# Patient Record
Sex: FEMALE | Race: OTHER | URBAN - METROPOLITAN AREA
[De-identification: names, ages, dates, MRNs, and addresses within clinical notes are randomized per-mention and may not be internally consistent; named-entity substitution may affect disease eponyms.]

---

## 2022-02-18 ENCOUNTER — OFFICE VISIT (OUTPATIENT)
Dept: OBGYN CLINIC | Facility: CLINIC | Age: 37
End: 2022-02-18
Payer: COMMERCIAL

## 2022-02-18 VITALS — WEIGHT: 147 LBS | DIASTOLIC BLOOD PRESSURE: 75 MMHG | SYSTOLIC BLOOD PRESSURE: 115 MMHG

## 2022-02-18 DIAGNOSIS — N92.6 IRREGULAR MENSTRUAL CYCLE: Primary | ICD-10-CM

## 2022-02-18 PROCEDURE — 3074F SYST BP LT 130 MM HG: CPT | Performed by: OBSTETRICS & GYNECOLOGY

## 2022-02-18 PROCEDURE — 3078F DIAST BP <80 MM HG: CPT | Performed by: OBSTETRICS & GYNECOLOGY

## 2022-02-18 PROCEDURE — 99202 OFFICE O/P NEW SF 15 MIN: CPT | Performed by: OBSTETRICS & GYNECOLOGY

## 2022-02-18 NOTE — PROGRESS NOTES
Subjective:   Patient ID: Colleen Toledo is a 31year old female. HPI  New patient  GYN problem visit  Complains of irregular menstrual cycle this month with occurrence starting February 12 with spotting and then moderate flow. Has had a previous menses on January 30. Usually cycles are every 28 days lasting 4 days and normal.  Has a history of a tubal ligation. Denies recent stress, medications. Has had general health screening labs within the past few months which she states was normal as well as a Pap test though she does not exactly know the results. States that she has had postcoital bleeding the majority of the times over the past year. Counseled on usual etiology. As she has bleeding currently, defers pelvic exam to next week. We will try to obtain her results of her labs and Pap test.  History/Other:   Review of Systems  No current outpatient medications on file. Allergies:No Known Allergies    Objective:   Physical Exam    Assessment & Plan:   No diagnosis found. No orders of the defined types were placed in this encounter.       Meds This Visit:  Requested Prescriptions      No prescriptions requested or ordered in this encounter       Imaging & Referrals:  None

## 2022-02-28 ENCOUNTER — OFFICE VISIT (OUTPATIENT)
Dept: OBGYN CLINIC | Facility: CLINIC | Age: 37
End: 2022-02-28
Payer: COMMERCIAL

## 2022-02-28 VITALS — SYSTOLIC BLOOD PRESSURE: 104 MMHG | WEIGHT: 151 LBS | HEART RATE: 76 BPM | DIASTOLIC BLOOD PRESSURE: 61 MMHG

## 2022-02-28 DIAGNOSIS — N93.0 POSTCOITAL BLEEDING: ICD-10-CM

## 2022-02-28 DIAGNOSIS — N92.6 IRREGULAR MENSTRUAL CYCLE: Primary | ICD-10-CM

## 2022-02-28 PROCEDURE — 99213 OFFICE O/P EST LOW 20 MIN: CPT | Performed by: OBSTETRICS & GYNECOLOGY

## 2022-02-28 PROCEDURE — 3074F SYST BP LT 130 MM HG: CPT | Performed by: OBSTETRICS & GYNECOLOGY

## 2022-02-28 PROCEDURE — 3078F DIAST BP <80 MM HG: CPT | Performed by: OBSTETRICS & GYNECOLOGY

## 2022-02-28 NOTE — PROGRESS NOTES
HPI:    Patient ID: Hilary Patel is a 31year old year old female. HPI  Gyne f/u  Blood work reviewed shows hemoglobin of 10.4 low with low MCV 7.8 normal platelets. CMP was normal Pap test and HPV were normal and negative vitamin D low at 16 TSH normal at 0.708. Patient described postcoital bleeding the majority of encounters for the past year. In brief. Review of Systems   neg  No current outpatient medications on file. Physical Exam     Vitals: /61   Pulse 76   Wt 151 lb (68.5 kg)   LMP 02/12/2022 (Exact Date)   Genitourinary:   Pelvic exam was performed with patient supine and chaperone present. External genitalia- normal.  Bartholin's and [place]'s glands normal.  Urethral meatus- without lesions, mass, or discharge. Urethra- normal without lesion, cyst, mass, or tenderness. Vulva- normal.  Labia majora and minora without lesions. Vagina- normal, no lesions or discharge. Moist and well supported. Bladder-  nontender. No masses. Normal support. No evidence of cystocele,  abnormal bladder neck mobility or evident urinary incontinence. Cervix- smooth, normal epithelium without lesions or discharge. No motion tenderness. Uterus- normal size, shape, and contour. Nontender. No masses. Adnexa-  Nontender, no masses. Perineum- normal without lesions  Anus-  Normal appearing without lesions. ASSESSMENT/PLAN:  Irregular menstrual cycle. To monitor to determine if sporadic or the start of some change. Normal smooth appearing cervix and normal pelvic exam  Reassured. No orders of the defined types were placed in this encounter. No outpatient encounter medications on file as of 2/28/2022. No facility-administered encounter medications on file as of 2/28/2022.

## 2022-03-15 ENCOUNTER — TELEPHONE (OUTPATIENT)
Dept: OBGYN CLINIC | Facility: CLINIC | Age: 37
End: 2022-03-15

## 2022-03-15 RX ORDER — FERROUS SULFATE 325(65) MG
325 TABLET ORAL
Qty: 30 TABLET | Refills: 2 | Status: SHIPPED | OUTPATIENT
Start: 2022-03-15 | End: 2023-03-15

## 2022-03-15 NOTE — TELEPHONE ENCOUNTER
Patient name and  verified. Patient informed [address] sent by Lee Camara to [facility] pharmacy on file. No further questions.

## 2022-06-13 RX ORDER — FERROUS SULFATE 325(65) MG
TABLET ORAL
Qty: 90 TABLET | Refills: 0 | Status: SHIPPED | OUTPATIENT
Start: 2022-06-13

## 2022-09-26 ENCOUNTER — OFFICE VISIT (OUTPATIENT)
Dept: OBGYN CLINIC | Facility: CLINIC | Age: 37
End: 2022-09-26

## 2022-09-26 VITALS — WEIGHT: 154 LBS | DIASTOLIC BLOOD PRESSURE: 75 MMHG | SYSTOLIC BLOOD PRESSURE: 111 MMHG

## 2022-09-26 DIAGNOSIS — R23.4 BREAST SKIN CHANGES: Primary | ICD-10-CM

## 2022-09-26 DIAGNOSIS — D64.9 ANEMIA, UNSPECIFIED TYPE: ICD-10-CM

## 2022-09-26 PROBLEM — Z01.419 WELL WOMAN EXAM WITH ROUTINE GYNECOLOGICAL EXAM: Status: ACTIVE | Noted: 2022-09-26

## 2022-09-26 PROCEDURE — 99213 OFFICE O/P EST LOW 20 MIN: CPT | Performed by: OBSTETRICS & GYNECOLOGY

## 2022-09-26 PROCEDURE — 3078F DIAST BP <80 MM HG: CPT | Performed by: OBSTETRICS & GYNECOLOGY

## 2022-09-26 PROCEDURE — 3074F SYST BP LT 130 MM HG: CPT | Performed by: OBSTETRICS & GYNECOLOGY

## 2022-12-18 ENCOUNTER — WALK IN (OUTPATIENT)
Dept: URGENT CARE | Age: 37
End: 2022-12-18
Attending: FAMILY MEDICINE

## 2022-12-18 VITALS
HEART RATE: 88 BPM | TEMPERATURE: 98.1 F | DIASTOLIC BLOOD PRESSURE: 88 MMHG | SYSTOLIC BLOOD PRESSURE: 100 MMHG | OXYGEN SATURATION: 100 % | RESPIRATION RATE: 20 BRPM

## 2022-12-18 DIAGNOSIS — J02.9 SORE THROAT: Primary | ICD-10-CM

## 2022-12-18 LAB
INTERNAL PROCEDURAL CONTROLS ACCEPTABLE: YES
S PYO AG THROAT QL IA.RAPID: NEGATIVE

## 2022-12-18 PROCEDURE — 10002803 HB RX 637: Performed by: PHYSICIAN ASSISTANT

## 2022-12-18 PROCEDURE — 99203 OFFICE O/P NEW LOW 30 MIN: CPT

## 2022-12-18 PROCEDURE — 87880 STREP A ASSAY W/OPTIC: CPT | Performed by: PHYSICIAN ASSISTANT

## 2022-12-18 RX ORDER — IBUPROFEN 400 MG/1
400 TABLET ORAL ONCE
Status: COMPLETED | OUTPATIENT
Start: 2022-12-18 | End: 2022-12-18

## 2022-12-18 RX ORDER — AMOXICILLIN 875 MG/1
875 TABLET, COATED ORAL 2 TIMES DAILY
Qty: 20 TABLET | Refills: 0 | Status: SHIPPED | OUTPATIENT
Start: 2022-12-18 | End: 2023-04-29 | Stop reason: ALTCHOICE

## 2022-12-18 RX ADMIN — IBUPROFEN 400 MG: 400 TABLET ORAL at 14:08

## 2022-12-18 ASSESSMENT — ENCOUNTER SYMPTOMS
VOMITING: 0
NERVOUS/ANXIOUS: 0
FEVER: 1
FATIGUE: 0
HEADACHES: 0
NAUSEA: 0
SORE THROAT: 1
SHORTNESS OF BREATH: 0
ABDOMINAL PAIN: 0
RHINORRHEA: 0
COUGH: 0

## 2022-12-18 ASSESSMENT — PAIN SCALES - GENERAL: PAINLEVEL: 5

## 2023-04-29 ENCOUNTER — WALK IN (OUTPATIENT)
Dept: URGENT CARE | Age: 38
End: 2023-04-29

## 2023-04-29 VITALS
OXYGEN SATURATION: 97 % | TEMPERATURE: 98 F | HEART RATE: 106 BPM | SYSTOLIC BLOOD PRESSURE: 98 MMHG | RESPIRATION RATE: 20 BRPM | DIASTOLIC BLOOD PRESSURE: 67 MMHG

## 2023-04-29 DIAGNOSIS — J02.9 SORE THROAT: ICD-10-CM

## 2023-04-29 DIAGNOSIS — R13.10 ODYNOPHAGIA: ICD-10-CM

## 2023-04-29 DIAGNOSIS — J02.0 STREP PHARYNGITIS: Primary | ICD-10-CM

## 2023-04-29 LAB
INTERNAL PROCEDURAL CONTROLS ACCEPTABLE: YES
S PYO AG THROAT QL IA.RAPID: POSITIVE
TEST LOT EXPIRATION DATE: ABNORMAL
TEST LOT NUMBER: ABNORMAL

## 2023-04-29 PROCEDURE — 99213 OFFICE O/P EST LOW 20 MIN: CPT

## 2023-04-29 PROCEDURE — 87880 STREP A ASSAY W/OPTIC: CPT | Performed by: FAMILY MEDICINE

## 2023-04-29 RX ORDER — AMOXICILLIN AND CLAVULANATE POTASSIUM 875; 125 MG/1; MG/1
1 TABLET, FILM COATED ORAL 2 TIMES DAILY
Qty: 20 TABLET | Refills: 0 | Status: SHIPPED | OUTPATIENT
Start: 2023-04-29 | End: 2023-05-09

## 2023-04-29 RX ORDER — PREDNISONE 10 MG/1
10 TABLET ORAL DAILY
Qty: 5 TABLET | Refills: 0 | Status: SHIPPED | OUTPATIENT
Start: 2023-04-29 | End: 2023-05-04

## 2023-04-29 ASSESSMENT — PAIN SCALES - GENERAL: PAINLEVEL: 8

## 2023-10-02 ENCOUNTER — LAB ENCOUNTER (OUTPATIENT)
Dept: LAB | Age: 38
End: 2023-10-02
Attending: OBSTETRICS & GYNECOLOGY
Payer: COMMERCIAL

## 2023-10-02 ENCOUNTER — OFFICE VISIT (OUTPATIENT)
Dept: OBGYN CLINIC | Facility: CLINIC | Age: 38
End: 2023-10-02

## 2023-10-02 VITALS
HEIGHT: 65 IN | WEIGHT: 149 LBS | SYSTOLIC BLOOD PRESSURE: 110 MMHG | BODY MASS INDEX: 24.83 KG/M2 | HEART RATE: 82 BPM | DIASTOLIC BLOOD PRESSURE: 69 MMHG

## 2023-10-02 DIAGNOSIS — R53.83 OTHER FATIGUE: ICD-10-CM

## 2023-10-02 DIAGNOSIS — Z86.2 HISTORY OF ANEMIA: ICD-10-CM

## 2023-10-02 DIAGNOSIS — R53.83 OTHER FATIGUE: Primary | ICD-10-CM

## 2023-10-02 PROBLEM — D50.0 IRON DEFICIENCY ANEMIA DUE TO CHRONIC BLOOD LOSS: Status: ACTIVE | Noted: 2023-10-02

## 2023-10-02 LAB
ERYTHROCYTE [DISTWIDTH] IN BLOOD BY AUTOMATED COUNT: 16 %
HCT VFR BLD AUTO: 32.1 %
HGB BLD-MCNC: 9.7 G/DL
MCH RBC QN AUTO: 22.4 PG (ref 26–34)
MCHC RBC AUTO-ENTMCNC: 30.2 G/DL (ref 31–37)
MCV RBC AUTO: 74.1 FL
PLATELET # BLD AUTO: 398 10(3)UL (ref 150–450)
RBC # BLD AUTO: 4.33 X10(6)UL
WBC # BLD AUTO: 7.8 X10(3) UL (ref 4–11)

## 2023-10-02 PROCEDURE — 3008F BODY MASS INDEX DOCD: CPT | Performed by: OBSTETRICS & GYNECOLOGY

## 2023-10-02 PROCEDURE — 3074F SYST BP LT 130 MM HG: CPT | Performed by: OBSTETRICS & GYNECOLOGY

## 2023-10-02 PROCEDURE — 99212 OFFICE O/P EST SF 10 MIN: CPT | Performed by: OBSTETRICS & GYNECOLOGY

## 2023-10-02 PROCEDURE — 36415 COLL VENOUS BLD VENIPUNCTURE: CPT

## 2023-10-02 PROCEDURE — 85027 COMPLETE CBC AUTOMATED: CPT

## 2023-10-02 PROCEDURE — 3078F DIAST BP <80 MM HG: CPT | Performed by: OBSTETRICS & GYNECOLOGY

## 2023-10-03 ENCOUNTER — TELEPHONE (OUTPATIENT)
Dept: OBGYN CLINIC | Facility: CLINIC | Age: 38
End: 2023-10-03

## 2023-10-03 DIAGNOSIS — D50.0 IRON DEFICIENCY ANEMIA DUE TO CHRONIC BLOOD LOSS: Primary | ICD-10-CM

## 2023-10-03 NOTE — TELEPHONE ENCOUNTER
----- Message from Darling Craven MD sent at 10/3/2023 11:51 AM CDT -----  Please inform patient that her blood test from yesterday shows anemia, more than previously. Hemoglobin level of 9.7. Recommend that she continue oral supplements with iron tablet daily. To eat iron rich foods. I recommend that we add a pelvic ultrasound that I can do at the time of her upcoming annual visit. Please add if the patient is in agreement.     Pt also needs to go back to the lab for additional blood work.       MARCELL Koch

## 2023-10-04 NOTE — TELEPHONE ENCOUNTER
#[id number]    Pt informed of AJB recommendations. VU. Pt agrees to pelvic US at next office visit. Noted in appointment note. Pt requests that pharmacy preference update to [facility] on [address] in [place]. Pharmacy added to pt preferred.

## 2023-10-18 ENCOUNTER — LAB ENCOUNTER (OUTPATIENT)
Dept: LAB | Age: 38
End: 2023-10-18
Attending: OBSTETRICS & GYNECOLOGY
Payer: COMMERCIAL

## 2023-10-18 DIAGNOSIS — D50.0 IRON DEFICIENCY ANEMIA DUE TO CHRONIC BLOOD LOSS: ICD-10-CM

## 2023-10-18 LAB
DEPRECATED HBV CORE AB SER IA-ACNC: 4.2 NG/ML
IRON SATN MFR SERPL: 8 %
IRON SERPL-MCNC: 37 UG/DL
TIBC SERPL-MCNC: 466 UG/DL (ref 240–450)
TRANSFERRIN SERPL-MCNC: 313 MG/DL (ref 200–360)

## 2023-10-18 PROCEDURE — 36415 COLL VENOUS BLD VENIPUNCTURE: CPT

## 2023-10-18 PROCEDURE — 82728 ASSAY OF FERRITIN: CPT

## 2023-10-18 PROCEDURE — 83540 ASSAY OF IRON: CPT

## 2023-10-18 PROCEDURE — 84466 ASSAY OF TRANSFERRIN: CPT

## 2023-10-19 ENCOUNTER — OFFICE VISIT (OUTPATIENT)
Dept: OBGYN CLINIC | Facility: CLINIC | Age: 38
End: 2023-10-19

## 2023-10-19 ENCOUNTER — TELEPHONE (OUTPATIENT)
Dept: OBGYN CLINIC | Facility: CLINIC | Age: 38
End: 2023-10-19

## 2023-10-19 VITALS
HEIGHT: 65 IN | SYSTOLIC BLOOD PRESSURE: 114 MMHG | DIASTOLIC BLOOD PRESSURE: 79 MMHG | BODY MASS INDEX: 24.49 KG/M2 | WEIGHT: 147 LBS

## 2023-10-19 DIAGNOSIS — Z86.2 HISTORY OF ANEMIA: ICD-10-CM

## 2023-10-19 DIAGNOSIS — D50.0 IRON DEFICIENCY ANEMIA DUE TO CHRONIC BLOOD LOSS: ICD-10-CM

## 2023-10-19 DIAGNOSIS — Z01.419 WELL WOMAN EXAM WITH ROUTINE GYNECOLOGICAL EXAM: Primary | ICD-10-CM

## 2023-10-19 DIAGNOSIS — Z12.4 SCREENING FOR CERVICAL CANCER: ICD-10-CM

## 2023-10-19 PROCEDURE — 87624 HPV HI-RISK TYP POOLED RSLT: CPT | Performed by: OBSTETRICS & GYNECOLOGY

## 2023-10-19 RX ORDER — FERROUS SULFATE 325(65) MG
325 TABLET ORAL
Qty: 30 TABLET | Refills: 1 | Status: SHIPPED | OUTPATIENT
Start: 2023-10-19 | End: 2023-12-18

## 2023-10-19 NOTE — TELEPHONE ENCOUNTER
Please schedule the following surgery:    Procedure: Operative hysteroscopy with true clear or MyoSure; D&C; NovaSure endometrial ablation  Date: after menses  Dx:  Menorrhagia with regular cycle; endometrial polyps/fibroids; anemia  Pre-op appt: (Y/N or n/a) done  Admission type: (IN/OUT/OBVS)  Department of discharge(SDS/Floor): SDS  Expected length of stay: STS  Procedure length time (please enter amount you are requesting): 20 minutes  Recovery time (patients always ask): 3 to 5 days  Medical Clearance: (Y/N) no

## 2023-10-20 LAB — HPV I/H RISK 1 DNA SPEC QL NAA+PROBE: NEGATIVE

## 2023-10-25 LAB
CYTOLOGY CVX/VAG DOC THIN PREP: NORMAL
HPV16+18+45 E6+E7MRNA CVX NAA+PROBE: NEGATIVE

## 2023-10-27 ENCOUNTER — TELEPHONE (OUTPATIENT)
Dept: OBGYN CLINIC | Facility: CLINIC | Age: 38
End: 2023-10-27

## 2023-10-30 ENCOUNTER — TELEPHONE (OUTPATIENT)
Dept: OBGYN CLINIC | Facility: CLINIC | Age: 38
End: 2023-10-30

## 2023-10-30 NOTE — TELEPHONE ENCOUNTER
----- Message from Ani Kyle MD sent at 10/29/2023  9:28 PM CDT -----  Please send patient letter stating normal pap smear and HPV negative. She will need an annual exam in one year and a follow up pap smear in 3 years.

## 2023-11-13 ENCOUNTER — ORDERS FOR ADMISSION (OUTPATIENT)
Dept: OBGYN CLINIC | Facility: CLINIC | Age: 38
End: 2023-11-13

## 2023-11-13 ENCOUNTER — TELEPHONE (OUTPATIENT)
Dept: OBGYN CLINIC | Facility: CLINIC | Age: 38
End: 2023-11-13

## 2023-11-13 NOTE — TELEPHONE ENCOUNTER
Current Outpatient Medications on File Prior to Visit   Medication Sig Dispense Refill    Ferrous Sulfate 325 (65 Fe) MG Oral Tab Take 1 tablet (325 mg total) by mouth daily with breakfast. 30 tablet 1     No current facility-administered medications on file prior to visit.

## 2023-11-13 NOTE — H&P
Agustínjayashree 55 Patient Status:  [facility] Outpatient Surgery    3/18/1985 MRN [id number]   Location [place] [address] Attending Buddy Arrington MD   Hosp Day # 0 PCP No primary care provider on file. Date of Admission:  23    HPI:   Reason for Admission:  Outpt surg    History of Present Illness:   Problem eval of iron def anemia, menorrhagia  38 ;  lmp- 10/1/23  Recently found to have worsening of her anemia with hemoglobin of 9.7 and low ferritin and serum iron. Has not yet started on iron supplementation. Has noted menses to have premenstrual spotting for 2 to 3 days and then up to 3 days of heavier flow changing every hour with grape and some plum size clots. Notes some fatigue at times. Transvaginal pelvic ultrasound today shows endometrial polyps and possibly a submucous fibroid 6 mm. Counseled and patient desires to proceed with operative hysteroscopy and D&C as well as NovaSure endometrial ablation. She has a history of tubal ligation. No desire to conceive in the future. Counseled on the procedures risks and benefits and elects to proceed. Risks and benefits of surgery were discussed in detail including, but not limited to:  Bleeding, blood transfusion, infection, injury to intraabdominal organ such as bowel, bladder, or ureter; injury to blood vessels, persisting pain, need for reoperation, need for open laparotomy,venous thromboembolism, and anesthetic risks, among others. Allergies/Medications: Allergies:  No Known Allergies     Medications:  No medications prior to admission. History     Past Medical History:  No past medical history on file.     Past Surgical History:  Past Surgical History:   Procedure Laterality Date    [facility] PROC FOR BALDEV Granda       Past OB History:  OB History    Para Term  AB Living   4 4 4 0 0 4   SAB IAB Ectopic Multiple Live Births # Outcome Date GA Lbr Toney/2nd Weight Sex Delivery Anes PTL Lv   4 Term            3 Term            2 Term            1 Term                 Past GYN History:   Menorrhagia w reg cycle      Social History:  Social History     Tobacco Use    Smoking status: Never    Smokeless tobacco: Never   Substance Use Topics    Alcohol use: Never        Review of Systems:     Normal menses, no abnormal bleeding, pelvic pain or discharge, no breast pain or new or enlarging lumps on self exam, no side effects of hormonal medications, no vaginal bleeding, no discharge or pelvic pain, no hot flashes. Gynecological: as above  Gastrointestinal: denies  Genitourinary: denies  Constitutional: denies  Cardiovascular: denies  Respiratory: denies  Integumentary: denies  Neurologic: denies  Psychiatric: denies    Physical Exam:   BP: ()/()   AO: ()/()   No intake or output data in the 24 hours ending 11/13/23 1112    Constitutional: She appears well-developed and well-nourished. HENT:   Head: Normocephalic. Neck: Normal range of motion. Cardiovascular: Normal rate and regular rhythm. Pulmonary/Chest: Effort normal.   Abdominal: Soft. Normal appearance and bowel sounds are normal. She exhibits no mass. There is no hepatosplenomegaly. There is no tenderness. There is no rebound and no CVA tenderness. No hernia. Hernia confirmed negative in the ventral area, confirmed negative in the right inguinal area and confirmed negative in the left inguinal area. External genitalia normal.  Vulva normal  Vagina- normal, no lesions or discharge  Cervix- normal without lesions or discharge. No motion tenderness. Uterus- normal size, shape, and contour. Nontender. No masses. Adnexa-  Nontender, no masses. Musculoskeletal: Normal range of motion. Lymphadenopathy:        Right: No inguinal adenopathy present. Left: No inguinal adenopathy present. Neurological: She is alert. Skin: Skin is warm.    Psychiatric: Her behavior is normal. Judgment normal.       Results:   Diagnostics:  No results found. Data Review:   Lab Results   Component Value Date    WBC 7.8 10/02/2023    HGB 9.7 (L) 10/02/2023    HCT 32.1 (L) 10/02/2023    .0 10/02/2023         Assessment/Plan:    Iron deficiency anemia due to chronic blood loss  Menorrhagia. Vaginal ultrasound suggestive of endometrial polyps and/or submucous fibroids. Plan operative hysteroscopy with true clear or MyoSure, D&C, NovaSure endometrial ablation. All of the findings and plan were discussed with the patient. She notes understanding and agrees with the plan of care. All questions were answered to the best of my ability at this time. Surgical consent: Risks and benefits of surgery were discussed in detail including, but not limited to:  Bleeding, blood transfusion, infection, injury to intraabdominal organ such as bowel, bladder, or ureter; uterine perforation; injury to blood vessels, persisting pain, need for reoperation, need for open laparotomy,venous thromboembolism, and anesthetic risks, among others.       Prudence MD Linda  11/13/2023  11:12 AM

## 2023-11-14 NOTE — TELEPHONE ENCOUNTER
Patient returning call, will need instruction for upcoming procedure. Please call. Okay to leave detailed message.

## 2023-11-15 NOTE — TELEPHONE ENCOUNTER
Pt dropped off FMLA to be completed. HIPAA/FCR completed and fee will be billed. Forms faxed to forms dept. Please call pt with any questions/concerns. Pt would like to  copy in Cl location.

## 2023-11-16 NOTE — BRIEF OP NOTE
Pre-Operative Diagnosis: Menorrhagia with regular cycle [N92.0]  Endometrial polyp [N84.0]  Anemia, unspecified type [D64.9]     Post-Operative Diagnosis: Menorrhagia with regular cycle [A38. 0]Endometrial polyp [T34. 0]Anemia, unspecified type [D64.9]      Procedure Performed:   Operative hysteroscopy with Trueclear; dilation and curettage; NovaSure endometrial ablation    Surgeon(s) and Role:     * Mike Tsai MD - Primary    Assistant(s):        Surgical Findings: small endometrial polyps; polypoid endometrium.        Specimen: endometrial curettings with polyps     Estimated Blood Loss: 3 cc    Dictation Number:  Autumn Guaman MD  11/16/2023  9:49 AM

## 2023-11-16 NOTE — ANESTHESIA PROCEDURE NOTES
Airway  Date/Time: 11/16/2023 9:04 AM  Urgency: Elective      General Information and Staff    Patient location during procedure: OR  Anesthesiologist: Ha Gusman MD  Performed: anesthesiologist   Performed by: Ha Gusman MD  Authorized by: Ha Gusman MD      Indications and Patient Condition  Indications for airway management: anesthesia  Sedation level: deep  Preoxygenated: yes  Patient position: sniffing  Mask difficulty assessment: 1 - vent by mask    Final Airway Details  Final airway type: supraglottic airway      Successful airway: classic  Size 4       Number of attempts at approach: 1

## 2023-11-16 NOTE — INTERVAL H&P NOTE
Pre-op Diagnosis: Menorrhagia with regular cycle [N92.0]  Endometrial polyp [N84.0]  Anemia, unspecified type [D64.9]    The above referenced H&P was reviewed by Eliot Velazquez MD on 11/16/2023, the patient was examined and no significant changes have occurred in the patient's condition since the H&P was performed. I discussed with the patient and/or legal representative the potential benefits, risks and side effects of this procedure; the likelihood of the patient achieving goals; and potential problems that might occur during recuperation. I discussed reasonable alternatives to the procedure, including risks, benefits and side effects related to the alternatives and risks related to not receiving this procedure. We will proceed with procedure as planned.

## 2023-11-16 NOTE — OPERATIVE REPORT
[facility]    PATIENT'S NAME: Ronald MCKEON   ATTENDING PHYSICIAN: Susan Mcgarry MD   OPERATING PHYSICIAN: Susan Mcgarry MD   PATIENT ACCOUNT#:   [de-identified]    LOCATION:  [place] [address]  MEDICAL RECORD #:   [id number]       YOB: 1985  ADMISSION DATE:       11/16/2023      OPERATION DATE:  11/16/2023    OPERATIVE REPORT      PREOPERATIVE DIAGNOSIS:    1. Menometrorrhagia. 2.   Endometrial polyps. 3.   Iron-deficiency anemia. POSTOPERATIVE DIAGNOSIS:    1. Menometrorrhagia. 2.   Endometrial polyps. 3.   Iron-deficiency anemia. PROCEDURE:    1. Operative hysteroscopy with endometrial polypectomy with TruClear. 2.   NovaSure endometrial ablation with hysteroscopy. ANESTHESIA:  General by Dr. Quique Bucio. MEDICATIONS:  Toradol 30 mg IV push at the start of endometrial ablation. ESTIMATED BLOOD LOSS:  3 mL. FINDINGS:  External genitalia, Bartholin's, urethra and [place] glands are normal and vaginal mucosa normal.  The cervix, ectocervix, and endocervical canal normal appearing. Uterine sound to 10 cm. Endometrial cavity with polypoid endometrium and small endometrial polyps. No evidence for submucous fibroids. Both tubal ostia could be visualized. OPERATIVE TECHNIQUE:  The patient was taken to the operating room and placed in the supine position. After an adequate level of general anesthesia obtained, the legs were placed in modified lithotomy position in Swisher stirrups, and the vulva and vagina were prepped and draped in the standard manner, and the bladder was drained with a straight catheter. The pelvic exam was performed, and the Graves speculum was placed in the anterior lip of the cervix, grasped with a single-tooth tenaculum. The uterus placed on traction. The uterus was sounded, and the cervix progressively dilated sufficiently. Utilizing the TruClear hysteroscope, the hysteroscopy was carried out.   Utilizing the small TruClear device for soft tissue, endometrial polyps were removed thoroughly. The device was then removed and a D and C performed with sharp intrauterine curettage circumferentially with tissue sent to Pathology. The NovaSure endometrial sheath was then passed transcervically. Cavity depth was entered. The array was opened within the cavity and 90-degree rotation in each direction and secured firmly with proper seal by the cervical cone. Cavity width of 4.7 cm. The cavity integrity test was passed and endometrial ablation was accomplished over 1 minute 33 seconds with 166 samuel of power setting. The sheath was then withdrawn and the array was intact. The hysteroscopy was carried out and global endometrial ablation was noted. The tenaculum was removed from the cervix and silver nitrate applied to the cervix. The procedure was ended. All sponge and instrument counts were correct, and the patient was taken out of general anesthesia and transferred to recovery room in stable condition.     Dictated By Blanco Fuller MD  d: 11/16/2023 09:55:13  t: 11/16/2023 16:27:19  Job [id number]  [facility]/

## 2023-11-20 NOTE — TELEPHONE ENCOUNTER
Patient had surgery on 11/16/2023 with Dr Ellen Schneider. Dr Ellen Schneider told patient to come in for a 1 week post op appointment. NO appointments available, request a nurse to call to schedule.   Martiniquais Interpretor

## 2023-11-20 NOTE — TELEPHONE ENCOUNTER
Patient made a mistake scheduling her for a postop visit 1 week after endometrial ablation. She can be seen a month after the procedure.   Please reschedule

## 2023-11-20 NOTE — TELEPHONE ENCOUNTER
Patient verified name and     Aware of recommendations of 1 month post op appt. Rescheduled for . Patient asking if we can send her letter to return back to work tomorrow but with lifting restrictions.  Routing to [facility] for consideration

## 2023-11-22 NOTE — TELEPHONE ENCOUNTER
FMLA forms for pt's spouse received and logged for processing. Valid FCR/PAWEL expires 11/15/24.

## 2023-11-30 NOTE — TELEPHONE ENCOUNTER
Pt called to follow up on [place] paperwork. Documents were not received by spouse's employer. Please call with .

## 2023-12-13 NOTE — TELEPHONE ENCOUNTER
Attempted to reach pt to obtain details no answer LVM. Details are needed for both pt and spouse's FMLA.

## 2023-12-15 NOTE — TELEPHONE ENCOUNTER
Dr. Barney,     Pts spouse was out of work to care for pt due to recent Sx. Pt spouse was out from 11/16/23---RTW 11/22/23. Do you support the request?        Thank you,  Rula

## 2023-12-18 NOTE — PROGRESS NOTES
Subjective:     Patient ID: Rinku Laguna is a 41year old female. HPI  Postop visit  She had delayed menses but yesterday and today has had some brownish-red blood in feeling like she is having her. .  Joan Lea on anticipated hypomenorrhea and menstrual changes after endometrial ablation. Will monitor her upcoming cycles. Reassured. OPERATIVE REPORT  PREOPERATIVE DIAGNOSIS:    1. Menometrorrhagia. 2.       Endometrial polyps. 3.       Iron-deficiency anemia. POSTOPERATIVE DIAGNOSIS:    1. Menometrorrhagia. 2.       Endometrial polyps. 3.       Iron-deficiency anemia. PROCEDURE:    1. Operative hysteroscopy with endometrial polypectomy with TruClear. 2.       NovaSure endometrial ablation with hysteroscopy. Endometrium; curettage:   Fragments of secretory endometrium and endometrial polyp. No definite evidence of carcinoma identified. History/Other:   Review of Systems  Current Outpatient Medications   Medication Sig Dispense Refill    ibuprofen 600 MG Oral Tab Take 1 tablet (600 mg total) by mouth every 6 (six) hours as needed for Pain. 10 tablet 0    Ferrous Sulfate 325 (65 Fe) MG Oral Tab Take 1 tablet (325 mg total) by mouth daily with breakfast. 30 tablet 1     Allergies:No Known Allergies    Past Medical History:   Diagnosis Date    Gastritis       Past Surgical History:   Procedure Laterality Date    [facility] PROC FOR BALDEV Granda    TUBAL LIGATION        Family History   Problem Relation Age of Onset    Cancer Father         ???    No Known Problems Mother       Social History:   Social History     Socioeconomic History    Marital status:    Tobacco Use    Smoking status: Never    Smokeless tobacco: Never   Substance and Sexual Activity    Alcohol use: Never    Drug use: Never        Objective:   Physical Exam  Pelvic normal  Scant menstrual-like vaginal bleeding noted. Cervix normal  Adnexa without masses nontender  Assessment & Plan:   1. Postoperative state    Recovering well. Will menstrual diary  Annual GYN visits.     Meds This Visit:  Requested Prescriptions      No prescriptions requested or ordered in this encounter       Imaging & Referrals:  None

## 2023-12-18 NOTE — TELEPHONE ENCOUNTER
Dr. Barney,      *The ACKNOWLEDGE button has been moved to the top right ribbon*    Please sign off on form if you agree to: FMLA for spouse to care for pt due to recent Sx. Start: 11/16/23---RTW: 11/22/23   (place your signature on the first page only)    -From your Inbasket, Highlight the patient and click Chart   -Double click the 11/15/23 Forms Completion telephone encounter  -Scroll down to the Media section   -Click the blue Hyperlink: NERY Barney 12/8/23    -Click Acknowledge located in the top right ribbon/menu   -Drag the mouse into the blank space of the document and a + sign will appear. Left click to   electronically sign the document.     Thank you,  Rula

## 2023-12-20 NOTE — TELEPHONE ENCOUNTER
Forms completed and faxed to [employer] at [phone] confirmation rcv'd. ByRead message sent to pt.

## 2024-02-21 ENCOUNTER — WALK IN (OUTPATIENT)
Dept: OCCUPATIONAL MEDICINE | Age: 39
End: 2024-02-21

## 2024-02-21 ENCOUNTER — IMAGING SERVICES (OUTPATIENT)
Dept: GENERAL RADIOLOGY | Age: 39
End: 2024-02-21
Attending: FAMILY MEDICINE

## 2024-02-21 VITALS
TEMPERATURE: 97.3 F | OXYGEN SATURATION: 99 % | RESPIRATION RATE: 16 BRPM | DIASTOLIC BLOOD PRESSURE: 77 MMHG | SYSTOLIC BLOOD PRESSURE: 118 MMHG | HEART RATE: 80 BPM

## 2024-02-21 DIAGNOSIS — M62.838 TRAPEZIUS MUSCLE SPASM: ICD-10-CM

## 2024-02-21 DIAGNOSIS — M25.511 ACUTE PAIN OF RIGHT SHOULDER: Primary | ICD-10-CM

## 2024-02-21 DIAGNOSIS — M25.511 ACUTE PAIN OF RIGHT SHOULDER: ICD-10-CM

## 2024-02-21 PROCEDURE — 73030 X-RAY EXAM OF SHOULDER: CPT | Performed by: FAMILY MEDICINE

## 2024-02-21 RX ORDER — FERROUS SULFATE 325(65) MG
1 TABLET ORAL
COMMUNITY
Start: 2023-10-19

## 2024-02-21 RX ORDER — NAPROXEN 500 MG/1
500 TABLET ORAL 2 TIMES DAILY WITH MEALS
Qty: 20 TABLET | Refills: 0 | Status: SHIPPED | OUTPATIENT
Start: 2024-02-21

## 2024-02-21 RX ORDER — CYCLOBENZAPRINE HCL 5 MG
5 TABLET ORAL 2 TIMES DAILY PRN
Qty: 15 TABLET | Refills: 0 | Status: SHIPPED | OUTPATIENT
Start: 2024-02-21

## 2024-02-21 ASSESSMENT — ENCOUNTER SYMPTOMS
HEADACHES: 0
SHORTNESS OF BREATH: 0
DIZZINESS: 0
NUMBNESS: 0
BACK PAIN: 0
WEAKNESS: 0
LIGHT-HEADEDNESS: 0
WOUND: 0
ACTIVITY CHANGE: 1
VOMITING: 0

## 2024-02-21 ASSESSMENT — PAIN SCALES - GENERAL: PAINLEVEL: 8

## 2024-02-26 ENCOUNTER — APPOINTMENT (OUTPATIENT)
Dept: OCCUPATIONAL MEDICINE | Age: 39
End: 2024-02-26

## 2024-02-26 VITALS
DIASTOLIC BLOOD PRESSURE: 70 MMHG | TEMPERATURE: 97.6 F | OXYGEN SATURATION: 100 % | RESPIRATION RATE: 16 BRPM | HEART RATE: 85 BPM | SYSTOLIC BLOOD PRESSURE: 109 MMHG

## 2024-02-26 DIAGNOSIS — M25.511 ACUTE PAIN OF RIGHT SHOULDER: Primary | ICD-10-CM

## 2024-02-26 DIAGNOSIS — M62.838 TRAPEZIUS MUSCLE SPASM: ICD-10-CM

## 2024-02-26 RX ORDER — NAPROXEN 500 MG/1
500 TABLET ORAL 2 TIMES DAILY WITH MEALS
Qty: 60 TABLET | Refills: 1 | Status: SHIPPED | OUTPATIENT
Start: 2024-02-26

## 2024-02-26 ASSESSMENT — PAIN SCALES - GENERAL: PAINLEVEL: 3

## 2024-03-04 ENCOUNTER — IMAGING SERVICES (OUTPATIENT)
Dept: GENERAL RADIOLOGY | Age: 39
End: 2024-03-04
Attending: EMERGENCY MEDICINE

## 2024-03-04 ENCOUNTER — APPOINTMENT (OUTPATIENT)
Dept: OCCUPATIONAL MEDICINE | Age: 39
End: 2024-03-04

## 2024-03-04 VITALS
HEART RATE: 93 BPM | DIASTOLIC BLOOD PRESSURE: 78 MMHG | SYSTOLIC BLOOD PRESSURE: 128 MMHG | TEMPERATURE: 97.5 F | RESPIRATION RATE: 18 BRPM | OXYGEN SATURATION: 100 %

## 2024-03-04 DIAGNOSIS — M62.838 TRAPEZIUS MUSCLE SPASM: ICD-10-CM

## 2024-03-04 DIAGNOSIS — M54.12 CERVICAL RADICULOPATHY: ICD-10-CM

## 2024-03-04 DIAGNOSIS — M54.12 CERVICAL RADICULOPATHY: Primary | ICD-10-CM

## 2024-03-04 DIAGNOSIS — M25.511 ACUTE PAIN OF RIGHT SHOULDER: ICD-10-CM

## 2024-03-04 PROCEDURE — 72050 X-RAY EXAM NECK SPINE 4/5VWS: CPT | Performed by: EMERGENCY MEDICINE

## 2024-03-04 PROCEDURE — 99214 OFFICE O/P EST MOD 30 MIN: CPT | Performed by: EMERGENCY MEDICINE

## 2024-03-04 RX ORDER — BACLOFEN 10 MG/1
10 TABLET ORAL NIGHTLY PRN
Qty: 10 TABLET | Refills: 0 | Status: SHIPPED | OUTPATIENT
Start: 2024-03-04 | End: 2024-03-14

## 2024-03-04 RX ORDER — METHYLPREDNISOLONE 4 MG/1
4 TABLET ORAL SEE ADMIN INSTRUCTIONS
Qty: 21 TABLET | Refills: 0 | Status: SHIPPED | OUTPATIENT
Start: 2024-03-04

## 2024-03-12 ENCOUNTER — TELEPHONE (OUTPATIENT)
Dept: OCCUPATIONAL MEDICINE | Age: 39
End: 2024-03-12

## 2024-03-12 DIAGNOSIS — M25.511 ACUTE PAIN OF RIGHT SHOULDER: Primary | ICD-10-CM

## 2024-03-12 DIAGNOSIS — M62.838 TRAPEZIUS MUSCLE SPASM: ICD-10-CM

## 2024-03-12 DIAGNOSIS — M54.12 CERVICAL RADICULOPATHY: ICD-10-CM

## 2024-04-29 NOTE — PROGRESS NOTES
Subjective:     Patient ID: Ernestine Schneider is a 39 year old female.    HPI  GYN visit  Patient had hysteroscopy with NovaSure endometrial ablation on December 18, 2023.  As well as endometrial polypectomy.  Complains of noting scant amount of blood after sex on wiping and brownish-red on panty liner for a few hours after.  Denies vaginal discharge, itching, burning, or odor.  Since endometrial ablation has very scant brown menstrual period for 2 days.  History/Other:   Review of Systems   Constitutional: Negative.    Cardiovascular: Negative.    Gastrointestinal: Negative.    Genitourinary: Negative.    Skin: Negative.    Neurological: Negative.    Psychiatric/Behavioral: Negative.     All other systems reviewed and are negative.    Current Outpatient Medications   Medication Sig Dispense Refill    Meloxicam 15 MG Oral Tab       baclofen 10 MG Oral Tab Take 1 tablet (10 mg total) by mouth.      cyclobenzaprine 5 MG Oral Tab Take 1 tablet (5 mg total) by mouth 2 (two) times daily as needed. (Patient not taking: Reported on 4/29/2024)      ibuprofen 600 MG Oral Tab Take 1 tablet (600 mg total) by mouth every 6 (six) hours as needed for Pain. (Patient not taking: Reported on 12/18/2023) 10 tablet 0     Allergies:No Known Allergies    Past Medical History:    Gastritis      Past Surgical History:   Procedure Laterality Date    [facility] proc for pa      [facility]    Tubal ligation        Family History   Problem Relation Age of Onset    Cancer Father         ???    No Known Problems Mother       Social History:   Social History     Socioeconomic History    Marital status:    Tobacco Use    Smoking status: Never    Smokeless tobacco: Never   Substance and Sexual Activity    Alcohol use: Never    Drug use: Never     Social Determinants of Health      [facility]        Objective:   Physical Exam  Pelvic exam with chaperone Jayne NICOLE  External genitalia normal  Vagina with moist  mucosa no discharge  Cervix normal-appearing.  Mild inflammation at the distal canal.  No discharge.  Cultures done  No cervical motion tenderness  Uterus mobile and nontender  Adnexa no masses nontender  Assessment & Plan:   1. Menometrorrhagia    2. Irregular menstrual cycle      Mild cervicitis with mild postcoital spotting  Touch with silver nitrate stick completely resolved.  Will treat empirically with a week of doxycycline  Notify for recurrence    Meds This Visit:  Requested Prescriptions      No prescriptions requested or ordered in this encounter       Imaging & Referrals:  None

## 2024-05-08 RX ORDER — MELOXICAM 15 MG/1
TABLET ORAL
COMMUNITY

## 2024-05-08 RX ORDER — IBUPROFEN 600 MG/1
600 TABLET ORAL
COMMUNITY
Start: 2023-11-20 | End: 2024-05-10 | Stop reason: ALTCHOICE

## 2024-05-10 ENCOUNTER — OFFICE VISIT (OUTPATIENT)
Dept: FAMILY MEDICINE | Age: 39
End: 2024-05-10

## 2024-05-10 VITALS
SYSTOLIC BLOOD PRESSURE: 104 MMHG | HEIGHT: 65 IN | HEART RATE: 71 BPM | BODY MASS INDEX: 25.44 KG/M2 | RESPIRATION RATE: 16 BRPM | WEIGHT: 152.67 LBS | TEMPERATURE: 98.1 F | DIASTOLIC BLOOD PRESSURE: 70 MMHG

## 2024-05-10 DIAGNOSIS — D50.9 IRON DEFICIENCY ANEMIA, UNSPECIFIED IRON DEFICIENCY ANEMIA TYPE: ICD-10-CM

## 2024-05-10 DIAGNOSIS — Z00.00 ENCOUNTER FOR GENERAL ADULT MEDICAL EXAMINATION W/O ABNORMAL FINDINGS: Primary | ICD-10-CM

## 2024-05-10 DIAGNOSIS — E66.3 OVERWEIGHT (BMI 25.0-29.9): ICD-10-CM

## 2024-05-10 DIAGNOSIS — R45.86 MOOD CHANGES: ICD-10-CM

## 2024-05-10 DIAGNOSIS — R20.0 BILATERAL HAND NUMBNESS: ICD-10-CM

## 2024-05-10 DIAGNOSIS — Z23 IMMUNIZATION DUE: ICD-10-CM

## 2024-05-10 DIAGNOSIS — G47.9 TROUBLE IN SLEEPING: ICD-10-CM

## 2024-05-10 RX ORDER — DOXYCYCLINE 100 MG/1
CAPSULE ORAL
COMMUNITY
Start: 2024-05-01 | End: 2024-05-10 | Stop reason: ALTCHOICE

## 2024-05-10 ASSESSMENT — PATIENT HEALTH QUESTIONNAIRE - PHQ9
SUM OF ALL RESPONSES TO PHQ9 QUESTIONS 1 AND 2: 0
CLINICAL INTERPRETATION OF PHQ2 SCORE: NO FURTHER SCREENING NEEDED
2. FEELING DOWN, DEPRESSED OR HOPELESS: NOT AT ALL
6. FEELING BAD ABOUT YOURSELF - OR THAT YOU ARE A FAILURE OR HAVE LET YOURSELF OR YOUR FAMILY DOWN: NOT AT ALL
SUM OF ALL RESPONSES TO PHQ QUESTIONS 1-9: 0
10. IF YOU CHECKED OFF ANY PROBLEMS, HOW DIFFICULT HAVE THESE PROBLEMS MADE IT FOR YOU TO DO YOUR WORK, TAKE CARE OF THINGS AT HOME, OR GET ALONG WITH OTHER PEOPLE: NOT DIFFICULT AT ALL
4. FEELING TIRED OR HAVING LITTLE ENERGY: NOT AT ALL
5. POOR APPETITE, WEIGHT LOSS, OR OVEREATING: NOT AT ALL
3. TROUBLE FALLING OR STAYING ASLEEP OR SLEEPING TOO MUCH: NOT AT ALL
7. TROUBLE CONCENTRATING ON THINGS, SUCH AS READING THE NEWSPAPER OR WATCHING TELEVISION: NOT AT ALL
1. LITTLE INTEREST OR PLEASURE IN DOING THINGS: NOT AT ALL
9. THOUGHTS THAT YOU WOULD BE BETTER OFF DEAD, OR OF HURTING YOURSELF: NOT AT ALL
SUM OF ALL RESPONSES TO PHQ9 QUESTIONS 1 AND 2: 0
8. MOVING OR SPEAKING SO SLOWLY THAT OTHER PEOPLE COULD HAVE NOTICED. OR THE OPPOSITE, BEING SO FIGETY OR RESTLESS THAT YOU HAVE BEEN MOVING AROUND A LOT MORE THAN USUAL: NOT AT ALL

## 2024-06-07 ENCOUNTER — LAB SERVICES (OUTPATIENT)
Dept: LAB | Age: 39
End: 2024-06-07

## 2024-06-07 DIAGNOSIS — D50.9 IRON DEFICIENCY ANEMIA, UNSPECIFIED IRON DEFICIENCY ANEMIA TYPE: ICD-10-CM

## 2024-06-07 DIAGNOSIS — Z00.00 ENCOUNTER FOR GENERAL ADULT MEDICAL EXAMINATION W/O ABNORMAL FINDINGS: ICD-10-CM

## 2024-06-07 PROCEDURE — 83036 HEMOGLOBIN GLYCOSYLATED A1C: CPT | Performed by: CLINICAL MEDICAL LABORATORY

## 2024-06-07 PROCEDURE — 82306 VITAMIN D 25 HYDROXY: CPT | Performed by: CLINICAL MEDICAL LABORATORY

## 2024-06-07 PROCEDURE — 83540 ASSAY OF IRON: CPT | Performed by: CLINICAL MEDICAL LABORATORY

## 2024-06-07 PROCEDURE — 82607 VITAMIN B-12: CPT | Performed by: CLINICAL MEDICAL LABORATORY

## 2024-06-07 PROCEDURE — 80061 LIPID PANEL: CPT | Performed by: CLINICAL MEDICAL LABORATORY

## 2024-06-07 PROCEDURE — 80050 GENERAL HEALTH PANEL: CPT | Performed by: CLINICAL MEDICAL LABORATORY

## 2024-06-07 PROCEDURE — 82746 ASSAY OF FOLIC ACID SERUM: CPT | Performed by: CLINICAL MEDICAL LABORATORY

## 2024-06-07 PROCEDURE — 36415 COLL VENOUS BLD VENIPUNCTURE: CPT | Performed by: STUDENT IN AN ORGANIZED HEALTH CARE EDUCATION/TRAINING PROGRAM

## 2024-06-07 PROCEDURE — 83550 IRON BINDING TEST: CPT | Performed by: CLINICAL MEDICAL LABORATORY

## 2024-06-08 LAB
25(OH)D3+25(OH)D2 SERPL-MCNC: 22.5 NG/ML (ref 30–100)
ALBUMIN SERPL-MCNC: 3.8 G/DL (ref 3.6–5.1)
ALBUMIN/GLOB SERPL: 1.1 {RATIO} (ref 1–2.4)
ALP SERPL-CCNC: 55 UNITS/L (ref 45–117)
ALT SERPL-CCNC: 19 UNITS/L
ANION GAP SERPL CALC-SCNC: 8 MMOL/L (ref 7–19)
AST SERPL-CCNC: 12 UNITS/L
BASOPHILS # BLD: 0 K/MCL (ref 0–0.3)
BASOPHILS NFR BLD: 1 %
BILIRUB SERPL-MCNC: 0.5 MG/DL (ref 0.2–1)
BUN SERPL-MCNC: 12 MG/DL (ref 6–20)
BUN/CREAT SERPL: 19 (ref 7–25)
CALCIUM SERPL-MCNC: 9.7 MG/DL (ref 8.4–10.2)
CHLORIDE SERPL-SCNC: 106 MMOL/L (ref 97–110)
CHOLEST SERPL-MCNC: 191 MG/DL
CHOLEST/HDLC SERPL: 3.4 {RATIO}
CO2 SERPL-SCNC: 30 MMOL/L (ref 21–32)
CREAT SERPL-MCNC: 0.64 MG/DL (ref 0.51–0.95)
DEPRECATED RDW RBC: 42 FL (ref 39–50)
EGFRCR SERPLBLD CKD-EPI 2021: >90 ML/MIN/{1.73_M2}
EOSINOPHIL # BLD: 0.2 K/MCL (ref 0–0.5)
EOSINOPHIL NFR BLD: 3 %
ERYTHROCYTE [DISTWIDTH] IN BLOOD: 12.8 % (ref 11–15)
FASTING DURATION TIME PATIENT: 12 HOURS (ref 0–999)
FOLATE SERPL-MCNC: 17.4 NG/ML
GLOBULIN SER-MCNC: 3.4 G/DL (ref 2–4)
GLUCOSE SERPL-MCNC: 98 MG/DL (ref 70–99)
HBA1C MFR BLD: 5.4 % (ref 4.5–5.6)
HCT VFR BLD CALC: 39.3 % (ref 36–46.5)
HDLC SERPL-MCNC: 57 MG/DL
HGB BLD-MCNC: 12.4 G/DL (ref 12–15.5)
IMM GRANULOCYTES # BLD AUTO: 0 K/MCL (ref 0–0.2)
IMM GRANULOCYTES # BLD: 0 %
IRON SATN MFR SERPL: 20 % (ref 15–45)
IRON SERPL-MCNC: 66 MCG/DL (ref 50–170)
LDLC SERPL CALC-MCNC: 119 MG/DL
LYMPHOCYTES # BLD: 1.8 K/MCL (ref 1–4.8)
LYMPHOCYTES NFR BLD: 33 %
MCH RBC QN AUTO: 28.2 PG (ref 26–34)
MCHC RBC AUTO-ENTMCNC: 31.6 G/DL (ref 32–36.5)
MCV RBC AUTO: 89.5 FL (ref 78–100)
MONOCYTES # BLD: 0.3 K/MCL (ref 0.3–0.9)
MONOCYTES NFR BLD: 6 %
NEUTROPHILS # BLD: 3.2 K/MCL (ref 1.8–7.7)
NEUTROPHILS NFR BLD: 57 %
NONHDLC SERPL-MCNC: 134 MG/DL
NRBC BLD MANUAL-RTO: 0 /100 WBC
PLATELET # BLD AUTO: 274 K/MCL (ref 140–450)
POTASSIUM SERPL-SCNC: 4.7 MMOL/L (ref 3.4–5.1)
PROT SERPL-MCNC: 7.2 G/DL (ref 6.4–8.2)
RBC # BLD: 4.39 MIL/MCL (ref 4–5.2)
SODIUM SERPL-SCNC: 139 MMOL/L (ref 135–145)
TIBC SERPL-MCNC: 333 MCG/DL (ref 250–450)
TRIGL SERPL-MCNC: 75 MG/DL
TSH SERPL-ACNC: 0.88 MCUNITS/ML (ref 0.35–5)
VIT B12 SERPL-MCNC: 699 PG/ML (ref 211–911)
WBC # BLD: 5.5 K/MCL (ref 4.2–11)

## 2024-06-11 ENCOUNTER — TELEPHONE (OUTPATIENT)
Dept: INTERNAL MEDICINE | Age: 39
End: 2024-06-11

## 2024-06-18 ENCOUNTER — EXTERNAL RECORD (OUTPATIENT)
Dept: INTERNAL MEDICINE | Age: 39
End: 2024-06-18

## 2024-06-27 NOTE — PROGRESS NOTES
HPI:    Patient ID: Ernestine Schneider is a 39 year old year old female.    HPI  Gyne problem  She has been doing very well.  She has 2 days a month with scant brown and wears a liner.  Postcoitally has scant brown blood on wiping and just for that day.  Endometrial ablation last year, end of last year with polypectomy.  Feels mild cramps that bother her but does not take any medications.  Is bothered by having occasions of scant blood on wiping.  Request pelvic ultrasound.  Will order and perform.  Review of Systems   Constitutional: Negative.    Cardiovascular: Negative.    Gastrointestinal: Negative.    Genitourinary: Negative.    Skin: Negative.    Neurological: Negative.    Psychiatric/Behavioral: Negative.     All other systems reviewed and are negative.       Current Outpatient Medications   Medication Sig Dispense Refill    Meloxicam 15 MG Oral Tab       cyclobenzaprine 5 MG Oral Tab Take 1 tablet (5 mg total) by mouth 2 (two) times daily as needed. (Patient not taking: Reported on 6/27/2024)      baclofen 10 MG Oral Tab Take 1 tablet (10 mg total) by mouth. (Patient not taking: Reported on 6/27/2024)      ibuprofen 600 MG Oral Tab Take 1 tablet (600 mg total) by mouth every 6 (six) hours as needed for Pain. (Patient not taking: Reported on 12/18/2023) 10 tablet 0       Past Medical History:    Gastritis       Past Surgical History:   Procedure Laterality Date    [facility] proc for pa      Essure    Tubal ligation         Family History   Problem Relation Age of Onset    Cancer Father         ???    No Known Problems Mother        Social History     Socioeconomic History    Marital status:      Spouse name: Not on file    Number of children: Not on file    Years of education: Not on file    Highest education level: Not on file   Occupational History    Not on file   Tobacco Use    Smoking status: Never    Smokeless tobacco: Never   Substance and Sexual Activity    Alcohol use: Never    Drug use:  Never    Sexual activity: Not on file   Other Topics Concern    Not on file   Social History Narrative    Not on file     Social Determinants of Health     Financial Resource Strain: Not on file   Food Insecurity: Not on file   Transportation Needs: Not on file   Physical Activity: Not on file   Stress: Not on file   Social Connections: Not on file   Housing Stability: At Risk (8/18/2023)    Received from [facility]     Living Situation: Not on file     Housing Problems: Not on file       Physical Exam     Vitals: LMP  (LMP Unknown)     Genitourinary:   Pelvic exam was performed with patient supine and chaperone present.  Jayne  External genitalia- normal.  [place]'s and [place]'s glands normal.  Urethral meatus- without lesions, mass, or discharge.  Urethra- normal without lesion, cyst, mass, or tenderness.  Vulva- normal.  Labia majora and minora without lesions.   Vagina- normal, no lesions or discharge.  Moist and well supported.  Bladder-  nontender.  No masses.  Normal support.  No evidence of cystocele,  abnormal bladder neck mobility or evident urinary incontinence.  Cervix- smooth, normal epithelium without lesions or discharge.  No motion tenderness.  Scant red spot  Uterus- normal size, shape, and contour.  Nontender.  No masses.  Adnexa-  Nontender, no masses.   Perineum- normal without lesions  Anus-  Normal appearing without lesions.    ASSESSMENT/PLAN:  Postcoital spotting  Mild pelvic cramps  History of endometrial ablation end of last year and polypectomy hysteroscopy  Return for pelvic ultrasound.    Outpatient Encounter Medications as of 6/27/2024   Medication Sig Dispense Refill    Meloxicam 15 MG Oral Tab       cyclobenzaprine 5 MG Oral Tab Take 1 tablet (5 mg total) by mouth 2 (two) times daily as needed. (Patient not taking: Reported on 6/27/2024)      baclofen 10 MG Oral Tab Take 1 tablet (10 mg total) by mouth. (Patient not taking: Reported on 6/27/2024)      ibuprofen 600  MG Oral Tab Take 1 tablet (600 mg total) by mouth every 6 (six) hours as needed for Pain. (Patient not taking: Reported on 12/18/2023) 10 tablet 0     No facility-administered encounter medications on file as of 6/27/2024.

## 2024-07-08 NOTE — PROGRESS NOTES
Subjective:   Patient ID: Ernestine Schneider is a 39 year old female.    HPI  Gynecology follow-up  Here for pelvic ultrasound transvaginal  She has been amenorrheic since [employer] endometrial ablation in November.  States that has occasions of postcoital pink spotting on wiping and brownish discharge for few hours after.  She has not ever had any severe pain.  No problem with urination or bowel movements.  No issues with ongoing bleeding.  Transvaginal ultrasound is entirely normal and the ovaries 2.  Endometrium is appropriately indistinct and send 3.5 mm.  Reassured.    History/Other:   Review of Systems  Current Outpatient Medications   Medication Sig Dispense Refill    Meloxicam 15 MG Oral Tab       cyclobenzaprine 5 MG Oral Tab Take 1 tablet (5 mg total) by mouth 2 (two) times daily as needed. (Patient not taking: Reported on 6/27/2024)      baclofen 10 MG Oral Tab Take 1 tablet (10 mg total) by mouth. (Patient not taking: Reported on 6/27/2024)      ibuprofen 600 MG Oral Tab Take 1 tablet (600 mg total) by mouth every 6 (six) hours as needed for Pain. (Patient not taking: Reported on 12/18/2023) 10 tablet 0     Allergies:No Known Allergies    Objective:   Physical Exam  Pelvic speculum examination with normal pink smooth moist vagina.  No discharge.  Cervix with smooth ectocervix and no signs of inflammation, motion tenderness, or lesions.  Uterus normal in size and mobile  Adnexa no masses.  Assessment & Plan:   Postcoital spotting, scant.  Normal findings on examination, ultrasound, pelvic examination, cervical cultures and at the time of hysteroscopy and D&C.  Reassured.

## 2024-07-31 ENCOUNTER — APPOINTMENT (OUTPATIENT)
Dept: NEUROLOGY | Age: 39
End: 2024-07-31
Attending: STUDENT IN AN ORGANIZED HEALTH CARE EDUCATION/TRAINING PROGRAM

## 2024-08-16 ENCOUNTER — APPOINTMENT (OUTPATIENT)
Dept: FAMILY MEDICINE | Age: 39
End: 2024-08-16

## 2024-08-19 ENCOUNTER — APPOINTMENT (OUTPATIENT)
Dept: FAMILY MEDICINE | Age: 39
End: 2024-08-19

## 2024-08-19 VITALS
SYSTOLIC BLOOD PRESSURE: 107 MMHG | DIASTOLIC BLOOD PRESSURE: 72 MMHG | HEART RATE: 73 BPM | BODY MASS INDEX: 24.61 KG/M2 | RESPIRATION RATE: 16 BRPM | HEIGHT: 65 IN | TEMPERATURE: 98.1 F | OXYGEN SATURATION: 99 % | WEIGHT: 147.71 LBS

## 2024-08-19 DIAGNOSIS — G89.29 CHRONIC NECK AND BACK PAIN: ICD-10-CM

## 2024-08-19 DIAGNOSIS — E55.9 VITAMIN D DEFICIENCY: ICD-10-CM

## 2024-08-19 DIAGNOSIS — M54.9 CHRONIC NECK AND BACK PAIN: ICD-10-CM

## 2024-08-19 DIAGNOSIS — N93.0 POSTCOITAL BLEEDING: Primary | ICD-10-CM

## 2024-08-19 DIAGNOSIS — M54.2 CHRONIC NECK AND BACK PAIN: ICD-10-CM

## 2024-08-19 PROCEDURE — 99214 OFFICE O/P EST MOD 30 MIN: CPT | Performed by: STUDENT IN AN ORGANIZED HEALTH CARE EDUCATION/TRAINING PROGRAM

## 2024-08-19 ASSESSMENT — PATIENT HEALTH QUESTIONNAIRE - PHQ9
CLINICAL INTERPRETATION OF PHQ2 SCORE: NO FURTHER SCREENING NEEDED
1. LITTLE INTEREST OR PLEASURE IN DOING THINGS: NOT AT ALL
SUM OF ALL RESPONSES TO PHQ9 QUESTIONS 1 AND 2: 0
SUM OF ALL RESPONSES TO PHQ9 QUESTIONS 1 AND 2: 0
2. FEELING DOWN, DEPRESSED OR HOPELESS: NOT AT ALL

## 2024-08-26 ENCOUNTER — APPOINTMENT (OUTPATIENT)
Dept: OBGYN | Age: 39
End: 2024-08-26
Attending: STUDENT IN AN ORGANIZED HEALTH CARE EDUCATION/TRAINING PROGRAM

## 2024-09-25 ENCOUNTER — APPOINTMENT (OUTPATIENT)
Dept: NEUROLOGY | Age: 39
End: 2024-09-25
Attending: STUDENT IN AN ORGANIZED HEALTH CARE EDUCATION/TRAINING PROGRAM

## 2024-09-25 ENCOUNTER — WORKER'S COMP (OUTPATIENT)
Dept: OCCUPATIONAL MEDICINE | Age: 39
End: 2024-09-25

## 2024-09-25 ENCOUNTER — IMAGING SERVICES (OUTPATIENT)
Dept: GENERAL RADIOLOGY | Age: 39
End: 2024-09-25
Attending: FAMILY MEDICINE

## 2024-09-25 VITALS
TEMPERATURE: 98.7 F | HEART RATE: 70 BPM | RESPIRATION RATE: 18 BRPM | SYSTOLIC BLOOD PRESSURE: 105 MMHG | OXYGEN SATURATION: 99 % | DIASTOLIC BLOOD PRESSURE: 73 MMHG

## 2024-09-25 DIAGNOSIS — S43.401A SPRAIN OF RIGHT SHOULDER, UNSPECIFIED SHOULDER SPRAIN TYPE, INITIAL ENCOUNTER: ICD-10-CM

## 2024-09-25 DIAGNOSIS — S43.401A SPRAIN OF RIGHT SHOULDER, UNSPECIFIED SHOULDER SPRAIN TYPE, INITIAL ENCOUNTER: Primary | ICD-10-CM

## 2024-09-25 PROCEDURE — 96372 THER/PROPH/DIAG INJ SC/IM: CPT | Performed by: FAMILY MEDICINE

## 2024-09-25 PROCEDURE — 99214 OFFICE O/P EST MOD 30 MIN: CPT | Performed by: FAMILY MEDICINE

## 2024-09-25 PROCEDURE — 73030 X-RAY EXAM OF SHOULDER: CPT | Performed by: RADIOLOGY

## 2024-09-25 RX ORDER — KETOROLAC TROMETHAMINE 30 MG/ML
30 INJECTION, SOLUTION INTRAMUSCULAR; INTRAVENOUS ONCE
Status: COMPLETED | OUTPATIENT
Start: 2024-09-25 | End: 2024-09-25

## 2024-09-25 RX ADMIN — KETOROLAC TROMETHAMINE 30 MG: 30 INJECTION, SOLUTION INTRAMUSCULAR; INTRAVENOUS at 18:41

## 2024-09-25 ASSESSMENT — PAIN SCALES - GENERAL: PAINLEVEL: 8

## 2024-09-27 ENCOUNTER — WORKER'S COMP (OUTPATIENT)
Dept: OCCUPATIONAL MEDICINE | Age: 39
End: 2024-09-27

## 2024-09-27 VITALS
RESPIRATION RATE: 18 BRPM | HEART RATE: 75 BPM | SYSTOLIC BLOOD PRESSURE: 122 MMHG | DIASTOLIC BLOOD PRESSURE: 85 MMHG | TEMPERATURE: 97.8 F | OXYGEN SATURATION: 99 %

## 2024-09-27 DIAGNOSIS — M62.838 TRAPEZIUS MUSCLE SPASM: ICD-10-CM

## 2024-09-27 DIAGNOSIS — S43.401D SPRAIN OF RIGHT SHOULDER, UNSPECIFIED SHOULDER SPRAIN TYPE, SUBSEQUENT ENCOUNTER: Primary | ICD-10-CM

## 2024-09-27 RX ORDER — MELOXICAM 7.5 MG/1
7.5 TABLET ORAL DAILY
Qty: 15 TABLET | Refills: 0 | Status: SHIPPED | OUTPATIENT
Start: 2024-10-02 | End: 2024-10-17

## 2024-09-27 RX ORDER — BACLOFEN 10 MG/1
10 TABLET ORAL NIGHTLY PRN
Qty: 10 TABLET | Refills: 0 | Status: SHIPPED | OUTPATIENT
Start: 2024-09-27 | End: 2024-10-07

## 2024-09-27 RX ORDER — METHYLPREDNISOLONE 4 MG
4 TABLET, DOSE PACK ORAL SEE ADMIN INSTRUCTIONS
Qty: 21 TABLET | Refills: 0 | Status: SHIPPED | OUTPATIENT
Start: 2024-09-27

## 2024-09-27 ASSESSMENT — PAIN SCALES - GENERAL: PAINLEVEL: 6

## 2024-10-01 ENCOUNTER — TELEPHONE (OUTPATIENT)
Dept: INTERNAL MEDICINE | Age: 39
End: 2024-10-01

## 2024-11-01 ENCOUNTER — APPOINTMENT (OUTPATIENT)
Dept: OBGYN | Age: 39
End: 2024-11-01
Attending: STUDENT IN AN ORGANIZED HEALTH CARE EDUCATION/TRAINING PROGRAM

## 2024-11-06 ENCOUNTER — TELEPHONE (OUTPATIENT)
Dept: INTERNAL MEDICINE | Age: 39
End: 2024-11-06

## 2024-11-08 ENCOUNTER — APPOINTMENT (OUTPATIENT)
Dept: OBGYN | Age: 39
End: 2024-11-08

## 2024-11-08 VITALS
BODY MASS INDEX: 25.12 KG/M2 | HEIGHT: 65 IN | WEIGHT: 150.79 LBS | DIASTOLIC BLOOD PRESSURE: 67 MMHG | SYSTOLIC BLOOD PRESSURE: 109 MMHG

## 2024-11-08 DIAGNOSIS — N93.0 PCB (POST COITAL BLEEDING): Primary | ICD-10-CM

## 2024-11-08 DIAGNOSIS — Z98.890 S/P ENDOMETRIAL ABLATION: ICD-10-CM

## 2024-11-08 DIAGNOSIS — N88.8 FRIABLE CERVIX: ICD-10-CM

## 2024-11-08 DIAGNOSIS — R39.15 URINARY URGENCY: ICD-10-CM

## 2024-11-08 RX ORDER — ESTRADIOL 0.1 MG/G
1 CREAM VAGINAL DAILY
Qty: 42.5 G | Refills: 1 | Status: SHIPPED | OUTPATIENT
Start: 2024-11-08

## 2024-11-08 ASSESSMENT — PATIENT HEALTH QUESTIONNAIRE - PHQ9
1. LITTLE INTEREST OR PLEASURE IN DOING THINGS: NOT AT ALL
2. FEELING DOWN, DEPRESSED OR HOPELESS: NOT AT ALL
CLINICAL INTERPRETATION OF PHQ2 SCORE: NO FURTHER SCREENING NEEDED
SUM OF ALL RESPONSES TO PHQ9 QUESTIONS 1 AND 2: 0
SUM OF ALL RESPONSES TO PHQ9 QUESTIONS 1 AND 2: 0

## 2024-11-11 LAB
BACTERIAL VAGINOSIS VAG-IMP: NOT DETECTED
C ALBICANS DNA VAG QL NAA+PROBE: NOT DETECTED
C GLABRATA DNA VAG QL NAA+PROBE: NOT DETECTED
C TRACH RRNA SPEC QL NAA+PROBE: NEGATIVE
Lab: NORMAL
N GONORRHOEA RRNA SPEC QL NAA+PROBE: NEGATIVE
SERVICE CMNT-IMP: NORMAL
SERVICE CMNT-IMP: NORMAL
T VAGINALIS DNA VAG QL NAA+PROBE: NOT DETECTED

## 2024-12-09 ENCOUNTER — OFFICE VISIT (OUTPATIENT)
Dept: OBGYN | Age: 39
End: 2024-12-09

## 2024-12-09 VITALS
HEIGHT: 64 IN | DIASTOLIC BLOOD PRESSURE: 82 MMHG | SYSTOLIC BLOOD PRESSURE: 143 MMHG | WEIGHT: 155.42 LBS | BODY MASS INDEX: 26.53 KG/M2

## 2024-12-09 DIAGNOSIS — N93.0 POSTCOITAL BLEEDING: ICD-10-CM

## 2024-12-09 PROCEDURE — 99213 OFFICE O/P EST LOW 20 MIN: CPT | Performed by: STUDENT IN AN ORGANIZED HEALTH CARE EDUCATION/TRAINING PROGRAM

## 2025-09-16 ENCOUNTER — APPOINTMENT (OUTPATIENT)
Dept: FAMILY MEDICINE | Age: 40
End: 2025-09-16

## 2025-12-15 ENCOUNTER — APPOINTMENT (OUTPATIENT)
Dept: OBGYN | Age: 40
End: 2025-12-15

## (undated) NOTE — LETTER
11/20/2023              Bienvenido Horn        [address] [address]         To whom it may concern,    The above named patient is currently under my care, she is able to return to work on 11/21/2023 and to avoid heavy lifting.     Please do not hesitate to contact the office with any questions,        Sincerely,    Susan Mcgarry MD  [facility], [address] [place] [address]  [place] [address] [place] [address]  [address] [facility] [id number] [phone]

## (undated) NOTE — LETTER
October 30, 2023    Montserrat Mckeon  [address] [facility]  [place] [address]      Dear Sj Stark: This letter is to inform you that your pap smear was normal and your High Risk HPV test was negative. Therefore, you need to repeat your pap smear in three years. In spite of this normal result it is still strongly recommended that you continue to have your annual gynecological (pelvic and breast) examination. Should you have any further questions or would like to schedule your next appointment please feel free to contact our office at [phone].       Sincerely,    MD [facility], [address] [place] [address]  [phone]

## (undated) NOTE — LETTER
October 30, 2023    [place] Memos  [address] [facility] [address]      Dear Janki Mccartney: This letter is to inform you that your pap smear was normal and your High Risk HPV test was negative. Therefore, you need to repeat your pap smear in {3 YEARS/5 YEARS:4291}. In spite of this normal result it is still strongly recommended that you continue to have your annual gynecological (pelvic and breast) examination. Should you have any further questions or would like to schedule your next appointment please feel free to contact our office at [phone].         Sincerely,    Isabel Mckeon MD